# Patient Record
(demographics unavailable — no encounter records)

---

## 2025-03-14 NOTE — IMAGING
[de-identified] : Left Knee Exam:  Inspection: No effusion, no warmth, no ecchymosis, no erythema. Palpation: tenderness to palpation along IT band, no palpable defects, negative Devorah. Range of motion: 0-140 without pain Strength: 5/5 quadriceps and hamstring strength Special testing: Negative Lachman, negative Lloyd, negative anterior drawer, negative posterior drawer, negative patella apprehension; no extensor lag: no varus or valgus instability. Motor and sensory intact distally Gait: Non-antalgic gait  Lumbar Spine Exam:  Inspection: No swelling, no ecchymosis Palpation: Lumbar tenderness to palpation and spasm Range of motion: Full range of motion with pain Strength: 5/5 Motor exam; no focal deficits. Special Testing: negative straight leg raise; No ankle clonus; Negative Devorah Reflexes: Reflexes +2 Sensation: normal sensation to light touch distally Gait: non-antalgic  [Disc space narrowing] : Disc space narrowing [All Views] : anteroposterior, lateral [There are no fractures, subluxations or dislocations. No significant abnormalities are seen] : There are no fractures, subluxations or dislocations. No significant abnormalities are seen

## 2025-03-14 NOTE — HISTORY OF PRESENT ILLNESS
[de-identified] : 03/14/2025: Patient presents today for a new injury visit the L thigh. States she started getting pain 6 months ago. Does not recall any specific injury. No treatment to date.

## 2025-03-14 NOTE — ASSESSMENT
[FreeTextEntry1] : 64yo female presents with left IT band syndrome, degenerative disc disease lumbar, and lumbar spasm   X-rays reviewed and diagnosis discussed with patient  Instructed to attend PT  MDP rx sent - r/b/a discussed - instructed how to take medication  Use of heat on the back discussed  Activity modification  RTC in 4-6 weeks to re-eval    The patient's current medication management of their orthopedic diagnosis was reviewed today: (1) We discussed a comprehensive treatment plan that included pharmaceutical management involving the use of prescription medications. (2) There is a moderate risk of morbidity with further treatment, especially from use of prescription strength medications and possible side effects of these medications which include upset stomach with oral medications, skin reactions to topical medications and cardiac/renal/diabetes issues with long term use. (3) I recommended that the patient follow-up with their medical physician to discuss any significant specific potential issues with long term medication use such as interactions with current medications or with exacerbation of underlying medical comorbidities. (4) The benefits and risks associated with use of injectable, oral or topical, prescription and over the counter anti-inflammatory medications were discussed with the patient. The patient voiced understanding of the risks including but not limited to bleeding, stroke, kidney dysfunction, heart disease, and were referred to the black box warning label for further information.  I am working today under the supervision of Dr. Ortiz and I am following the plan of care of Dr. Ortiz as described by him on this date. Progress note completed by Lynette Pickard PA-C under the supervision of Dr. Ortiz.